# Patient Record
Sex: FEMALE | Race: WHITE | URBAN - METROPOLITAN AREA
[De-identification: names, ages, dates, MRNs, and addresses within clinical notes are randomized per-mention and may not be internally consistent; named-entity substitution may affect disease eponyms.]

---

## 2021-05-10 ENCOUNTER — WEB ENCOUNTER (OUTPATIENT)
Dept: URBAN - METROPOLITAN AREA CLINIC 96 | Facility: CLINIC | Age: 71
End: 2021-05-10

## 2021-05-10 ENCOUNTER — OFFICE VISIT (OUTPATIENT)
Dept: URBAN - METROPOLITAN AREA CLINIC 96 | Facility: CLINIC | Age: 71
End: 2021-05-10
Payer: MEDICARE

## 2021-05-10 VITALS
TEMPERATURE: 96.6 F | DIASTOLIC BLOOD PRESSURE: 119 MMHG | WEIGHT: 200 LBS | HEIGHT: 67 IN | BODY MASS INDEX: 31.39 KG/M2 | HEART RATE: 83 BPM | SYSTOLIC BLOOD PRESSURE: 193 MMHG

## 2021-05-10 DIAGNOSIS — E66.9 OBESITY (BMI 30-39.9): ICD-10-CM

## 2021-05-10 DIAGNOSIS — D36.9 TUBULAR ADENOMA: ICD-10-CM

## 2021-05-10 DIAGNOSIS — Z85.3 PERSONAL HISTORY OF BREAST CANCER: ICD-10-CM

## 2021-05-10 DIAGNOSIS — K76.0 FATTY LIVER: ICD-10-CM

## 2021-05-10 DIAGNOSIS — D12.6 SERRATED ADENOMA OF COLON: ICD-10-CM

## 2021-05-10 DIAGNOSIS — R93.2 ABNORMAL PET SCAN OF LIVER: ICD-10-CM

## 2021-05-10 DIAGNOSIS — Z86.010 PERSONAL HISTORY OF COLONIC POLYPS: ICD-10-CM

## 2021-05-10 PROBLEM — 429087003: Status: ACTIVE | Noted: 2021-05-10

## 2021-05-10 PROBLEM — 428283002: Status: ACTIVE | Noted: 2021-05-10

## 2021-05-10 PROBLEM — 162864005: Status: ACTIVE | Noted: 2021-05-10

## 2021-05-10 PROBLEM — 197321007: Status: ACTIVE | Noted: 2021-05-10

## 2021-05-10 PROCEDURE — 99214 OFFICE O/P EST MOD 30 MIN: CPT | Performed by: INTERNAL MEDICINE

## 2021-05-10 RX ORDER — SODIUM, POTASSIUM,MAG SULFATES 17.5-3.13G
177 ML SOLUTION, RECONSTITUTED, ORAL ORAL AS DIRECTED
Qty: 1 BOX | Refills: 0 | OUTPATIENT
Start: 2021-05-10 | End: 2021-05-11

## 2021-05-10 NOTE — HPI-TODAY'S VISIT:
70 y.o. WF Seen 3+ yrs ago Not back b/c had 2 CVA and s/p CEA Recently, backed up, had CT at [facility] and told maybe cirrhosis Busy w/ strokes and neuropathy so didn't think much of it until this showed up, pernell b/c liver enzymes been normal Had PET for pulm nodules Doesn't drink Never had liver issue that she is aware No wt loss No water retention R mid-axillary discomfort - thinks hip arthritis No bowel issues

## 2021-05-11 ENCOUNTER — TELEPHONE ENCOUNTER (OUTPATIENT)
Dept: URBAN - METROPOLITAN AREA CLINIC 23 | Facility: CLINIC | Age: 71
End: 2021-05-11

## 2021-05-11 LAB
A/G RATIO: 1.9
AFP, SERUM, TUMOR MARKER: 2.7
ALBUMIN: 5.3
ALKALINE PHOSPHATASE: 77
ALT (SGPT): 22
APTT: 32
AST (SGOT): 30
BASO (ABSOLUTE): 0
BASOS: 0
BILIRUBIN, TOTAL: 0.3
BUN/CREATININE RATIO: 18
BUN: 18
CALCIUM: 10.2
CARBON DIOXIDE, TOTAL: 21
CEA: 3.4
CHLORIDE: 101
CREATININE: 1.01
EGFR IF AFRICN AM: 64
EGFR IF NONAFRICN AM: 56
EOS (ABSOLUTE): 0.1
EOS: 1
GLOBULIN, TOTAL: 2.8
GLUCOSE: 102
HBSAG SCREEN: NEGATIVE
HEMATOCRIT: 43.5
HEMATOLOGY COMMENTS:: (no result)
HEMOGLOBIN: 14.4
HEP A AB, IGM: NEGATIVE
HEP B CORE AB, IGM: NEGATIVE
HEP C VIRUS AB: <0.1
IMMATURE CELLS: (no result)
IMMATURE GRANS (ABS): 0
IMMATURE GRANULOCYTES: 0
INR: 1
LYMPHS (ABSOLUTE): 4
LYMPHS: 37
MCH: 29.2
MCHC: 33.1
MCV: 88
MONOCYTES(ABSOLUTE): 1.7
MONOCYTES: 16
NEUTROPHILS (ABSOLUTE): 5.1
NEUTROPHILS: 46
NRBC: (no result)
PLATELETS: 205
POTASSIUM: 4.5
PROTEIN, TOTAL: 8.1
PROTHROMBIN TIME: 10.8
RBC: 4.93
RDW: 14.5
SODIUM: 141
WBC: 10.9

## 2021-05-19 ENCOUNTER — WEB ENCOUNTER (OUTPATIENT)
Dept: URBAN - METROPOLITAN AREA CLINIC 96 | Facility: CLINIC | Age: 71
End: 2021-05-19

## 2021-06-09 ENCOUNTER — WEB ENCOUNTER (OUTPATIENT)
Dept: URBAN - METROPOLITAN AREA CLINIC 96 | Facility: CLINIC | Age: 71
End: 2021-06-09

## 2021-06-10 ENCOUNTER — WEB ENCOUNTER (OUTPATIENT)
Dept: URBAN - METROPOLITAN AREA CLINIC 92 | Facility: CLINIC | Age: 71
End: 2021-06-10

## 2021-07-01 ENCOUNTER — OFFICE VISIT (OUTPATIENT)
Dept: URBAN - METROPOLITAN AREA CLINIC 98 | Facility: CLINIC | Age: 71
End: 2021-07-01
Payer: MEDICARE

## 2021-07-01 ENCOUNTER — LAB OUTSIDE AN ENCOUNTER (OUTPATIENT)
Dept: URBAN - METROPOLITAN AREA CLINIC 98 | Facility: CLINIC | Age: 71
End: 2021-07-01

## 2021-07-01 VITALS
WEIGHT: 211 LBS | HEIGHT: 67 IN | SYSTOLIC BLOOD PRESSURE: 184 MMHG | BODY MASS INDEX: 33.12 KG/M2 | HEART RATE: 92 BPM | DIASTOLIC BLOOD PRESSURE: 92 MMHG | TEMPERATURE: 97.3 F

## 2021-07-01 DIAGNOSIS — C50.919 MALIGNANT NEOPLASM OF FEMALE BREAST, UNSPECIFIED ESTROGEN RECEPTOR STATUS, UNSPECIFIED LATERALITY, UNSPECIFIED SITE OF BREAST: ICD-10-CM

## 2021-07-01 DIAGNOSIS — K74.60 CIRRHOSIS OF LIVER WITHOUT ASCITES, UNSPECIFIED HEPATIC CIRRHOSIS TYPE: ICD-10-CM

## 2021-07-01 DIAGNOSIS — E78.5 DYSLIPIDEMIA: ICD-10-CM

## 2021-07-01 DIAGNOSIS — R93.5 ABNORMAL MRI OF ABDOMEN: ICD-10-CM

## 2021-07-01 PROCEDURE — 3017F COLORECTAL CA SCREEN DOC REV: CPT | Performed by: INTERNAL MEDICINE

## 2021-07-01 PROCEDURE — G8427 DOCREV CUR MEDS BY ELIG CLIN: HCPCS | Performed by: INTERNAL MEDICINE

## 2021-07-01 PROCEDURE — 1036F TOBACCO NON-USER: CPT | Performed by: INTERNAL MEDICINE

## 2021-07-01 PROCEDURE — G8417 CALC BMI ABV UP PARAM F/U: HCPCS | Performed by: INTERNAL MEDICINE

## 2021-07-01 PROCEDURE — 99214 OFFICE O/P EST MOD 30 MIN: CPT | Performed by: INTERNAL MEDICINE

## 2021-07-01 PROCEDURE — G8482 FLU IMMUNIZE ORDER/ADMIN: HCPCS | Performed by: INTERNAL MEDICINE

## 2021-07-01 NOTE — HPI-TODAY'S VISIT:
Here on referral from Dr Zamarripa for new diagnosis of cirrhosis.  Pt had no prior knowledge of liver disease.  Her brother  of cirrhosis in , waiting for a liver transplant.  Pt has never drank heavily except in college.  Has 4 Susan's a year.   Her son is an alcoholic; her  was a heavy drinker - now  he has quit.  Pt was on TAmoxifen for 5 years for breast cancer.  Also she had 2 strokes last year- right arm "acts weird" and has right leg neuropathy - so she has to walk treadmill daily

## 2021-07-21 ENCOUNTER — CLAIMS CREATED FROM THE CLAIM WINDOW (OUTPATIENT)
Dept: URBAN - METROPOLITAN AREA CLINIC 4 | Facility: CLINIC | Age: 71
End: 2021-07-21
Payer: MEDICARE

## 2021-07-21 ENCOUNTER — OFFICE VISIT (OUTPATIENT)
Dept: URBAN - METROPOLITAN AREA SURGERY CENTER 18 | Facility: SURGERY CENTER | Age: 71
End: 2021-07-21
Payer: MEDICARE

## 2021-07-21 DIAGNOSIS — K29.80 ACUTE DUODENITIS: ICD-10-CM

## 2021-07-21 DIAGNOSIS — K31.89 GASTRIC FOVEOLAR HYPERPLASIA: ICD-10-CM

## 2021-07-21 DIAGNOSIS — K21.9 GASTRO-ESOPHAGEAL REFLUX DISEASE WITHOUT ESOPHAGITIS: ICD-10-CM

## 2021-07-21 DIAGNOSIS — D13.2 BENIGN NEOPLASM OF DUODENUM: ICD-10-CM

## 2021-07-21 DIAGNOSIS — K31.89 ACQUIRED DEFORMITY OF DUODENUM: ICD-10-CM

## 2021-07-21 DIAGNOSIS — K31.7 POLYP OF STOMACH AND DUODENUM: ICD-10-CM

## 2021-07-21 DIAGNOSIS — K21.9 ACID REFLUX: ICD-10-CM

## 2021-07-21 PROCEDURE — 43239 EGD BIOPSY SINGLE/MULTIPLE: CPT | Performed by: INTERNAL MEDICINE

## 2021-07-21 PROCEDURE — G8907 PT DOC NO EVENTS ON DISCHARG: HCPCS | Performed by: INTERNAL MEDICINE

## 2021-07-21 PROCEDURE — 88312 SPECIAL STAINS GROUP 1: CPT | Performed by: PATHOLOGY

## 2021-07-21 PROCEDURE — 88305 TISSUE EXAM BY PATHOLOGIST: CPT | Performed by: PATHOLOGY

## 2021-12-01 ENCOUNTER — LAB OUTSIDE AN ENCOUNTER (OUTPATIENT)
Dept: URBAN - METROPOLITAN AREA CLINIC 98 | Facility: CLINIC | Age: 71
End: 2021-12-01

## 2021-12-29 ENCOUNTER — LAB OUTSIDE AN ENCOUNTER (OUTPATIENT)
Dept: URBAN - METROPOLITAN AREA CLINIC 98 | Facility: CLINIC | Age: 71
End: 2021-12-29

## 2022-01-14 LAB
A/G RATIO: 1.8
A1A PHENOTYPE CONFIRMATION: (no result)
A1A RFX TO PHENOTYPE: (no result)
AAT, DNA ANALYSIS: (no result)
ACTIN (SMOOTH MUSCLE) ANTIBODY: 5
ADDITIONAL INFORMATION:: (no result)
AFP, SERUM, TUMOR MARKER: 2.6
ALBUMIN: 4.6
ALKALINE PHOSPHATASE: 69
ALPHA-1-ANTITRYPSIN, SERUM: 106
ALT (SGPT): 14
ANTI-CENTROMERE B ANTIBODIES: <0.2
ANTI-DNA (DS) AB QN: <1
ANTI-JO-1: <0.2
ANTICHROMATIN ANTIBODIES: <0.2
ANTISCLERODERMA-70 ANTIBODIES: <0.2
AST (SGOT): 20
BASO (ABSOLUTE): 0
BASOS: 0
BILIRUBIN, TOTAL: 0.4
BUN/CREATININE RATIO: 19
BUN: 15
CALCIUM: 9.2
CARBON DIOXIDE, TOTAL: 20
CHLORIDE: 104
CREATININE: 0.81
EGFR IF AFRICN AM: 83
EGFR IF NONAFRICN AM: 72
EOS (ABSOLUTE): 0.1
EOS: 1
FERRITIN, SERUM: 144
GGT: 18
GLOBULIN, TOTAL: 2.6
GLUCOSE: 97
HEMATOCRIT: 43
HEMATOLOGY COMMENTS:: (no result)
HEMOGLOBIN: 13.5
IMMATURE CELLS: (no result)
IMMATURE GRANS (ABS): 0
IMMATURE GRANULOCYTES: 0
IMMUNOGLOBULIN A, QN, SERUM: 55
IMMUNOGLOBULIN G, QN, SERUM: 708
IMMUNOGLOBULIN M, QN, SERUM: 108
INR: 0.9
INTERPRETATION: (no result)
IRON BIND.CAP.(TIBC): 360
IRON SATURATION: 19
IRON: 67
LYMPHS (ABSOLUTE): 3.2
LYMPHS: 33
Lab: (no result)
Lab: (no result)
MCH: 28.2
MCHC: 31.4
MCV: 90
MITOCHONDRIAL (M2) ANTIBODY: <20
MONOCYTES(ABSOLUTE): 2.5
MONOCYTES: 26
NEUTROPHILS (ABSOLUTE): 4
NEUTROPHILS: 40
NRBC: (no result)
PLATELETS: 186
POTASSIUM: 4.3
PROTEIN, TOTAL: 7.2
PROTHROMBIN TIME: 9.9
RBC: 4.78
RDW: 13.9
RNP ANTIBODIES: 0.6
SJOGREN'S ANTI-SS-A: <0.2
SJOGREN'S ANTI-SS-B: <0.2
SMITH ANTIBODIES: <0.2
SODIUM: 141
UIBC: 293
WBC: 9.8

## 2022-01-18 ENCOUNTER — OFFICE VISIT (OUTPATIENT)
Dept: URBAN - METROPOLITAN AREA CLINIC 98 | Facility: CLINIC | Age: 72
End: 2022-01-18
Payer: MEDICARE

## 2022-01-18 VITALS
HEIGHT: 67 IN | TEMPERATURE: 97.4 F | HEART RATE: 98 BPM | SYSTOLIC BLOOD PRESSURE: 190 MMHG | BODY MASS INDEX: 33.65 KG/M2 | WEIGHT: 214.4 LBS | DIASTOLIC BLOOD PRESSURE: 101 MMHG

## 2022-01-18 DIAGNOSIS — C50.919 MALIGNANT NEOPLASM OF FEMALE BREAST, UNSPECIFIED ESTROGEN RECEPTOR STATUS, UNSPECIFIED LATERALITY, UNSPECIFIED SITE OF BREAST: ICD-10-CM

## 2022-01-18 DIAGNOSIS — Z14.8 ALPHA-1-ANTITRYPSIN DEFICIENCY CARRIER: ICD-10-CM

## 2022-01-18 DIAGNOSIS — E78.5 DYSLIPIDEMIA: ICD-10-CM

## 2022-01-18 DIAGNOSIS — R93.5 ABNORMAL MRI OF ABDOMEN: ICD-10-CM

## 2022-01-18 DIAGNOSIS — K74.60 CIRRHOSIS OF LIVER WITHOUT ASCITES, UNSPECIFIED HEPATIC CIRRHOSIS TYPE: ICD-10-CM

## 2022-01-18 PROCEDURE — 99214 OFFICE O/P EST MOD 30 MIN: CPT | Performed by: INTERNAL MEDICINE

## 2022-01-18 NOTE — HPI-OTHER HISTORIES
Here to follow up for cirrhosis.  She admits anxiety about the diagnosis as her brother did die of complications of cirrhosis - "I don't want to go through that"  Right now : doing well overall.   MRI is scheduled - but system crashed so she has to reschedule does 10 min on treadmill daily

## 2022-05-02 ENCOUNTER — TELEPHONE ENCOUNTER (OUTPATIENT)
Dept: URBAN - METROPOLITAN AREA CLINIC 98 | Facility: CLINIC | Age: 72
End: 2022-05-02

## 2022-05-05 ENCOUNTER — OFFICE VISIT (OUTPATIENT)
Dept: URBAN - METROPOLITAN AREA CLINIC 98 | Facility: CLINIC | Age: 72
End: 2022-05-05
Payer: MEDICARE

## 2022-05-05 VITALS
HEART RATE: 77 BPM | WEIGHT: 217 LBS | DIASTOLIC BLOOD PRESSURE: 104 MMHG | HEIGHT: 67 IN | BODY MASS INDEX: 34.06 KG/M2 | TEMPERATURE: 98.5 F | SYSTOLIC BLOOD PRESSURE: 149 MMHG

## 2022-05-05 DIAGNOSIS — K57.92 DIVERTICULITIS: ICD-10-CM

## 2022-05-05 DIAGNOSIS — E78.5 DYSLIPIDEMIA: ICD-10-CM

## 2022-05-05 DIAGNOSIS — Z14.8 ALPHA-1-ANTITRYPSIN DEFICIENCY CARRIER: ICD-10-CM

## 2022-05-05 DIAGNOSIS — R93.5 ABNORMAL MRI OF ABDOMEN: ICD-10-CM

## 2022-05-05 DIAGNOSIS — K74.60 CIRRHOSIS OF LIVER WITHOUT ASCITES, UNSPECIFIED HEPATIC CIRRHOSIS TYPE: ICD-10-CM

## 2022-05-05 DIAGNOSIS — C50.919 MALIGNANT NEOPLASM OF FEMALE BREAST, UNSPECIFIED ESTROGEN RECEPTOR STATUS, UNSPECIFIED LATERALITY, UNSPECIFIED SITE OF BREAST: ICD-10-CM

## 2022-05-05 PROCEDURE — 99214 OFFICE O/P EST MOD 30 MIN: CPT | Performed by: INTERNAL MEDICINE

## 2022-05-05 RX ORDER — METRONIDAZOLE 500 MG/1
1 TABLET TABLET ORAL TWICE A DAY
Qty: 28 TABLET | Refills: 0 | OUTPATIENT
Start: 2022-05-05 | End: 2022-05-19

## 2022-05-05 RX ORDER — CIPROFLOXACIN HYDROCHLORIDE 500 MG/1
1 TABLET TABLET, FILM COATED ORAL
Qty: 28 TABLET | Refills: 0 | OUTPATIENT
Start: 2022-05-05 | End: 2022-05-19

## 2022-05-05 NOTE — HPI-OTHER HISTORIES
Here to discuss recent diverticulitis with persistent abdominal pain.  Augmentin x 10 days.  Felt a lot better.   Then 10 d later started throbbing again in left side.  tolerating diet.  occasional constipation   REPORT EXAM: CT Abdomen + Pelvis w/ IV Contrast  CLINICAL INDICATION: left sided abdominal pain hx of diverticulitis, tender on exam;Abdominal pain unspecified.   TECHNIQUE: Following administration of non-ionic IV contrast, postcontrast images through the abdomen and pelvis were obtained. [id number] If applicable, point-of-care testing was approved following departmental protocol.  COMPARISON: CT abdomen pelvis 8/22/2020  FINDINGS:  Lower Thorax: Unremarkable.  Liver: Unremarkable.  Gallbladder/Biliary Tree: Normal.  Spleen: Unremarkable.  Pancreas: Unremarkable.  Adrenal Glands: No adrenal nodule.  Kidneys/Ureters: No hydronephrosis or hydroureter. The kidneys enhance symmetrically. Subcentimeter hypoattenuating lesions, too small to further characterize.  Gastrointestinal: Small hiatal hernia. Colonic diverticulosis with wall thickening and pericolonic fat stranding in the proximal to mid descending colon. No definite extraluminal air or evidence of abscess formation. Normal appendix. No dilated bowel to suggest obstruction.  Bladder: Unremarkable.  Uterus/Adnexa: Unremarkable.  Lymph Nodes: No pathologically enlarged lymph nodes.  Vessels: Moderate to severe atherosclerotic disease of the abdominal aorta and its branches. Fusiform dilation of the infrarenal abdominal aorta measuring up to 2.4 cm in diameter.  Peritoneum/Retroperitoneum: No ascites. No intraperitoneal free air.  Bones/Soft Tissues: No aggressive osseous lesion. Multilevel degenerative changes of the spine.  IMPRESSION:  Findings suggestive of acute diverticulitis involving the descending colon.

## 2022-05-23 ENCOUNTER — OFFICE VISIT (OUTPATIENT)
Dept: URBAN - METROPOLITAN AREA TELEHEALTH 2 | Facility: TELEHEALTH | Age: 72
End: 2022-05-23
Payer: MEDICARE

## 2022-05-23 ENCOUNTER — TELEPHONE ENCOUNTER (OUTPATIENT)
Dept: URBAN - METROPOLITAN AREA CLINIC 98 | Facility: CLINIC | Age: 72
End: 2022-05-23

## 2022-05-23 DIAGNOSIS — K74.69 CIRRHOSIS, CRYPTOGENIC: ICD-10-CM

## 2022-05-23 DIAGNOSIS — E78.5 DYSLIPIDEMIA: ICD-10-CM

## 2022-05-23 DIAGNOSIS — R93.5 ABNORMAL MRI OF ABDOMEN: ICD-10-CM

## 2022-05-23 DIAGNOSIS — C50.819 MALIGNANT NEOPLASM OF OVERLAPPING SITES OF UNSPECIFIED FEMALE BREAST: ICD-10-CM

## 2022-05-23 DIAGNOSIS — K57.92 DIVERTICULITIS: ICD-10-CM

## 2022-05-23 DIAGNOSIS — Z14.8 ALPHA-1-ANTITRYPSIN DEFICIENCY CARRIER: ICD-10-CM

## 2022-05-23 PROCEDURE — 99214 OFFICE O/P EST MOD 30 MIN: CPT | Performed by: INTERNAL MEDICINE

## 2022-05-23 NOTE — HPI-TODAY'S VISIT:
Got some diarrhea and then constipation  no more abdominal pain after taking cipro flagyl  no fevers tolerating normal diet

## 2022-07-11 ENCOUNTER — LAB OUTSIDE AN ENCOUNTER (OUTPATIENT)
Dept: URBAN - METROPOLITAN AREA TELEHEALTH 2 | Facility: TELEHEALTH | Age: 72
End: 2022-07-11

## 2022-07-18 ENCOUNTER — LAB OUTSIDE AN ENCOUNTER (OUTPATIENT)
Dept: URBAN - METROPOLITAN AREA CLINIC 98 | Facility: CLINIC | Age: 72
End: 2022-07-18

## 2022-08-23 ENCOUNTER — TELEPHONE ENCOUNTER (OUTPATIENT)
Dept: URBAN - METROPOLITAN AREA CLINIC 98 | Facility: CLINIC | Age: 72
End: 2022-08-23

## 2022-10-05 ENCOUNTER — OFFICE VISIT (OUTPATIENT)
Dept: URBAN - METROPOLITAN AREA SURGERY CENTER 18 | Facility: SURGERY CENTER | Age: 72
End: 2022-10-05
Payer: MEDICARE

## 2022-10-05 ENCOUNTER — CLAIMS CREATED FROM THE CLAIM WINDOW (OUTPATIENT)
Dept: URBAN - METROPOLITAN AREA CLINIC 4 | Facility: CLINIC | Age: 72
End: 2022-10-05
Payer: MEDICARE

## 2022-10-05 DIAGNOSIS — K63.89 OTHER SPECIFIED DISEASES OF INTESTINE: ICD-10-CM

## 2022-10-05 DIAGNOSIS — K62.1 ANAL AND RECTAL POLYP: ICD-10-CM

## 2022-10-05 DIAGNOSIS — K63.5 BENIGN COLON POLYP: ICD-10-CM

## 2022-10-05 DIAGNOSIS — Z87.19 ESOPHAGEAL FOOD BOLUS: ICD-10-CM

## 2022-10-05 PROCEDURE — 88305 TISSUE EXAM BY PATHOLOGIST: CPT | Performed by: PATHOLOGY

## 2022-10-05 PROCEDURE — 45380 COLONOSCOPY AND BIOPSY: CPT | Performed by: INTERNAL MEDICINE

## 2022-10-05 PROCEDURE — G8907 PT DOC NO EVENTS ON DISCHARG: HCPCS | Performed by: INTERNAL MEDICINE

## 2022-11-07 ENCOUNTER — LAB OUTSIDE AN ENCOUNTER (OUTPATIENT)
Dept: URBAN - METROPOLITAN AREA CLINIC 98 | Facility: CLINIC | Age: 72
End: 2022-11-07

## 2022-12-30 LAB
A/G RATIO: 1.9
AFP, SERUM, TUMOR MARKER: 2
ALBUMIN: 5.1
ALKALINE PHOSPHATASE: 83
ALT (SGPT): 16
AST (SGOT): 21
BASO (ABSOLUTE): 0.1
BASOS: 1
BILIRUBIN, TOTAL: 0.3
BUN/CREATININE RATIO: 18
BUN: 18
CALCIUM: 9.9
CARBON DIOXIDE, TOTAL: 20
CHLORIDE: 102
CREATININE: 0.98
EGFR: 61
EOS (ABSOLUTE): 0
EOS: 0
GGT: 21
GLOBULIN, TOTAL: 2.7
GLUCOSE: 90
HEMATOCRIT: 45.1
HEMATOLOGY COMMENTS:: (no result)
HEMOGLOBIN: 14.5
IMMATURE CELLS: (no result)
IMMATURE GRANS (ABS): (no result)
IMMATURE GRANULOCYTES: (no result)
INR: 1
LYMPHS (ABSOLUTE): 3.3
LYMPHS: 30
MCH: 28.9
MCHC: 32.2
MCV: 90
MONOCYTES(ABSOLUTE): 2
MONOCYTES: 18
NEUTROPHILS (ABSOLUTE): 5.7
NEUTROPHILS: 51
NRBC: (no result)
PLATELETS: 205
POTASSIUM: 4.7
PROTEIN, TOTAL: 7.8
PROTHROMBIN TIME: 10
RBC: 5.01
RDW: 13
SODIUM: 138
WBC: 11.1

## 2023-01-05 ENCOUNTER — LAB OUTSIDE AN ENCOUNTER (OUTPATIENT)
Dept: URBAN - METROPOLITAN AREA CLINIC 98 | Facility: CLINIC | Age: 73
End: 2023-01-05

## 2023-01-05 ENCOUNTER — OFFICE VISIT (OUTPATIENT)
Dept: URBAN - METROPOLITAN AREA CLINIC 98 | Facility: CLINIC | Age: 73
End: 2023-01-05
Payer: MEDICARE

## 2023-01-05 VITALS
HEIGHT: 67 IN | TEMPERATURE: 97 F | DIASTOLIC BLOOD PRESSURE: 110 MMHG | WEIGHT: 218 LBS | BODY MASS INDEX: 34.21 KG/M2 | SYSTOLIC BLOOD PRESSURE: 193 MMHG | HEART RATE: 82 BPM

## 2023-01-05 DIAGNOSIS — Z14.8 ALPHA-1-ANTITRYPSIN DEFICIENCY CARRIER: ICD-10-CM

## 2023-01-05 DIAGNOSIS — K57.92 DIVERTICULITIS: ICD-10-CM

## 2023-01-05 DIAGNOSIS — R93.5 ABNORMAL MRI OF ABDOMEN: ICD-10-CM

## 2023-01-05 DIAGNOSIS — K74.60 CIRRHOSIS OF LIVER WITHOUT ASCITES, UNSPECIFIED HEPATIC CIRRHOSIS TYPE: ICD-10-CM

## 2023-01-05 DIAGNOSIS — C50.919 MALIGNANT NEOPLASM OF FEMALE BREAST, UNSPECIFIED ESTROGEN RECEPTOR STATUS, UNSPECIFIED LATERALITY, UNSPECIFIED SITE OF BREAST: ICD-10-CM

## 2023-01-05 DIAGNOSIS — E78.5 DYSLIPIDEMIA: ICD-10-CM

## 2023-01-05 PROBLEM — 19943007: Status: ACTIVE | Noted: 2021-07-01

## 2023-01-05 PROBLEM — 370992007: Status: ACTIVE | Noted: 2021-07-01

## 2023-01-05 PROBLEM — 372064008: Status: ACTIVE | Noted: 2021-07-01

## 2023-01-05 PROCEDURE — 99214 OFFICE O/P EST MOD 30 MIN: CPT | Performed by: INTERNAL MEDICINE

## 2023-01-05 NOTE — HPI-TODAY'S VISIT:
has gained weight recently : back pain  starting regimen daily of exercises  could not do MRI due to worsening back pain wanting to do a pill called "Go Low" - that will "melt the fat off"

## 2023-01-31 ENCOUNTER — TELEPHONE ENCOUNTER (OUTPATIENT)
Dept: URBAN - METROPOLITAN AREA CLINIC 98 | Facility: CLINIC | Age: 73
End: 2023-01-31

## 2023-04-17 ENCOUNTER — LAB OUTSIDE AN ENCOUNTER (OUTPATIENT)
Dept: URBAN - METROPOLITAN AREA CLINIC 98 | Facility: CLINIC | Age: 73
End: 2023-04-17

## 2023-04-17 LAB
CREATININE POC: 1
PERFORMING LAB: (no result)

## 2023-04-24 ENCOUNTER — TELEPHONE ENCOUNTER (OUTPATIENT)
Dept: URBAN - METROPOLITAN AREA CLINIC 35 | Facility: CLINIC | Age: 73
End: 2023-04-24

## 2023-07-05 ENCOUNTER — LAB OUTSIDE AN ENCOUNTER (OUTPATIENT)
Dept: URBAN - METROPOLITAN AREA CLINIC 98 | Facility: CLINIC | Age: 73
End: 2023-07-05

## 2023-07-06 LAB
A/G RATIO: 1.9
AFP, SERUM, TUMOR MARKER: 2.8
ALBUMIN: 5.1
ALKALINE PHOSPHATASE: 68
ALT (SGPT): 19
AST (SGOT): 26
BASO (ABSOLUTE): 0.1
BASOS: 1
BILIRUBIN, TOTAL: 0.3
BUN/CREATININE RATIO: 21
BUN: 23
CALCIUM: 10.3
CARBON DIOXIDE, TOTAL: 24
CHLORIDE: 102
CREATININE: 1.08
EGFR: 54
EOS (ABSOLUTE): 0.1
EOS: 1
GLOBULIN, TOTAL: 2.7
GLUCOSE: 103
HEMATOCRIT: 46.2
HEMATOLOGY COMMENTS:: (no result)
HEMOGLOBIN: 15.4
IMMATURE CELLS: (no result)
IMMATURE GRANS (ABS): 0
IMMATURE GRANULOCYTES: 0
INR: 1
LYMPHS (ABSOLUTE): 3.2
LYMPHS: 33
MCH: 30.2
MCHC: 33.3
MCV: 91
MONOCYTES(ABSOLUTE): 2.6
MONOCYTES: 26
NEUTROPHILS (ABSOLUTE): 3.8
NEUTROPHILS: 39
NRBC: (no result)
PLATELETS: 202
POTASSIUM: 5.4
PROTEIN, TOTAL: 7.8
PROTHROMBIN TIME: 10.5
RBC: 5.1
RDW: 13.8
SODIUM: 142
WBC: 9.8

## 2023-07-14 ENCOUNTER — DASHBOARD ENCOUNTERS (OUTPATIENT)
Age: 73
End: 2023-07-14

## 2023-07-14 ENCOUNTER — OFFICE VISIT (OUTPATIENT)
Dept: URBAN - METROPOLITAN AREA CLINIC 98 | Facility: CLINIC | Age: 73
End: 2023-07-14
Payer: MEDICARE

## 2023-07-14 VITALS
HEIGHT: 67 IN | HEART RATE: 78 BPM | DIASTOLIC BLOOD PRESSURE: 111 MMHG | TEMPERATURE: 97 F | SYSTOLIC BLOOD PRESSURE: 193 MMHG | BODY MASS INDEX: 34.37 KG/M2 | WEIGHT: 219 LBS

## 2023-07-14 DIAGNOSIS — C50.919 MALIGNANT NEOPLASM OF FEMALE BREAST, UNSPECIFIED ESTROGEN RECEPTOR STATUS, UNSPECIFIED LATERALITY, UNSPECIFIED SITE OF BREAST: ICD-10-CM

## 2023-07-14 DIAGNOSIS — Z14.8 ALPHA-1-ANTITRYPSIN DEFICIENCY CARRIER: ICD-10-CM

## 2023-07-14 DIAGNOSIS — N17.9 AKI (ACUTE KIDNEY INJURY): ICD-10-CM

## 2023-07-14 DIAGNOSIS — K59.01 CONSTIPATION BY DELAYED COLONIC TRANSIT: ICD-10-CM

## 2023-07-14 DIAGNOSIS — K74.60 CIRRHOSIS OF LIVER WITHOUT ASCITES, UNSPECIFIED HEPATIC CIRRHOSIS TYPE: ICD-10-CM

## 2023-07-14 DIAGNOSIS — E87.5 HYPERKALEMIA: ICD-10-CM

## 2023-07-14 DIAGNOSIS — K57.92 DIVERTICULITIS: ICD-10-CM

## 2023-07-14 DIAGNOSIS — R93.5 ABNORMAL MRI OF ABDOMEN: ICD-10-CM

## 2023-07-14 DIAGNOSIS — E78.5 DYSLIPIDEMIA: ICD-10-CM

## 2023-07-14 PROBLEM — 35298007: Status: ACTIVE | Noted: 2023-07-14

## 2023-07-14 PROCEDURE — 99214 OFFICE O/P EST MOD 30 MIN: CPT | Performed by: INTERNAL MEDICINE

## 2023-07-14 RX ORDER — CIPROFLOXACIN HYDROCHLORIDE 500 MG/1
1 TABLET TABLET, FILM COATED ORAL
Qty: 14 TABLET | Refills: 0 | OUTPATIENT
Start: 2023-07-14 | End: 2023-07-17

## 2023-07-14 RX ORDER — METRONIDAZOLE 500 MG/1
1 TABLET TABLET ORAL THREE TIMES A DAY
Qty: 21 TABLET | Refills: 0 | OUTPATIENT
Start: 2023-07-14 | End: 2023-07-28

## 2023-07-14 NOTE — HPI-TODAY'S VISIT:
HEre to follow up on her liver disease  takes miralax daily for constipation but stools are pencil thin and soft

## 2023-07-14 NOTE — HPI-OTHER HISTORIES
4/2023  CT ABDOMEN WITH AND WITHOUT CONTRAST - LIVER PROTOCOL  CLINICAL HISTORY: Cirrhosis. Remote history of breast cancer.  TECHNIQUE: CT of the abdomen with and without IV contrast with images from the lung bases through the iliac crest using a triple phase liver protocol. A total of 95 mL of Omnipaque 350 contrast was administered intravenously. Oral contrast material was also administered prior to the exam. Images were obtained precontrast and postcontrast in the arterial, portal venous and delayed phases. Reformatted coronal and sagittal images were also submitted for review. This exam was performed utilizing automated exposure control as a radiation dose lowering technique.  COMPARISON: Prior CT of the chest dated 05/17/2022 and prior PET/CT dated 05/03/2021.  FINDINGS:  CT ABDOMEN:  Lower Thorax: Stable scarring involving the lung bases bilaterally. No visualized pleural or pericardial effusion. Liver: The liver is diffusely decreased in density consistent with fatty infiltration. The liver has a nodular contour with relative atrophy of the left hepatic lobe consistent with patient's known history of cirrhosis. Arterial phase images show no foci of hypervascular enhancement. Specifically, there is no hyperenhancement identified within segment VIII in the area of the previously noted uptake on the PET/CT. The hepatic veins and portal veins are patent and enhance normally. No intrahepatic biliary ductal dilatation. Gallbladder and biliary system: The gallbladder is normal. The extrahepatic bile duct is not dilated. Spleen: Normal and without mass. Pancreas: Normal. No pancreatic mass. No pancreatic duct dilatation. No peripancreatic fat stranding or fluid. Adrenal glands: Normal. No focal nodule. Kidneys and Collecting System: Bilateral benign renal cysts. Additional subcentimeter hypodensities are identified, which are too small to characterize, but statistically most likely represent small cysts. No suspicious renal mass. No follow-up renal imaging necessary. No hydronephrosis. Gastrointestinal tract: The visualized bowel in the upper abdomen is normal in appearance without obstruction. Peritoneum/Retroperitoneum: No adenopathy by CT size criteria. Stable shotty aortocaval node, on series 11 image 51, which measures 9 mm in short-axis diameter. This was not previously hypermetabolic. No ascites. No free air. Abdominal wall: Normal. No abdominal wall hernia or fluid collection. Vascular: The visualized abdominal aorta and its major branches are normal. No aneurysm.  MUSCULOSKELETAL: No aggressive-appearing osseous lesions.  IMPRESSION: Cirrhotic appearance to the liver with no definite focal lesions identified.  No splenomegaly or varices identified.  Otherwise, incidental findings as described above.

## 2023-07-15 PROBLEM — 307496006: Status: ACTIVE | Noted: 2022-05-05

## 2023-10-14 ENCOUNTER — LAB OUTSIDE AN ENCOUNTER (OUTPATIENT)
Dept: URBAN - METROPOLITAN AREA CLINIC 98 | Facility: CLINIC | Age: 73
End: 2023-10-14

## 2023-12-12 ENCOUNTER — OFFICE VISIT (OUTPATIENT)
Dept: URBAN - METROPOLITAN AREA CLINIC 97 | Facility: CLINIC | Age: 73
End: 2023-12-12

## 2024-01-02 ENCOUNTER — OFFICE VISIT (OUTPATIENT)
Dept: URBAN - METROPOLITAN AREA CLINIC 98 | Facility: CLINIC | Age: 74
End: 2024-01-02

## 2024-01-14 ENCOUNTER — LAB OUTSIDE AN ENCOUNTER (OUTPATIENT)
Dept: URBAN - METROPOLITAN AREA CLINIC 98 | Facility: CLINIC | Age: 74
End: 2024-01-14